# Patient Record
Sex: MALE | Race: NATIVE HAWAIIAN OR OTHER PACIFIC ISLANDER | Employment: PART TIME | URBAN - METROPOLITAN AREA
[De-identification: names, ages, dates, MRNs, and addresses within clinical notes are randomized per-mention and may not be internally consistent; named-entity substitution may affect disease eponyms.]

---

## 2021-03-16 ENCOUNTER — TELEPHONE (OUTPATIENT)
Dept: FAMILY MEDICINE | Facility: CLINIC | Age: 36
End: 2021-03-16

## 2021-03-16 NOTE — TELEPHONE ENCOUNTER
Telephone Intake--TG Adult  Date: 3/16/2021  Client Name:  Trenton  Preferred Name: BRANDEE     MRN: [id number]  : [birth year]       Age: 35  Caller Name: Self        Presenting Problem / Reason for Assessment   (Clinical History &Symptoms):     MTF  Identifies: Trans / Nonbinary  Goal: femininzation for overall health of both body / mind  Decrease dysphoria  Support: Positive  Dresses female openly  Wanting to see a therapist - resources sent      Clarify their goals/expected services from TG medical care--what are they looking for?    Clarify with patient (as necessary) that we are not a primary care clinic and that we do not have a surgeon. We strongly recommend that patients have a primary care doctor for their overall health needs, and we can refer to primary care clinics. We can assist with surgery referrals.  Future      Length of time experiencing symptoms: Within the last year....Finally could identify self      Seen Other Providers for Gender (if so, where):    M.D/other.(hormones) : NO  --If yes, request records from the provider at the 1st session.    Therapist: NO  --if yes, request a referral or summary letter from the therapist at the 1st session..    Psychiatrist: NO  --if yes,, request records from the provider at the 1st session..      Other Medical/Mental Health Diagnoses: Gender Dysphoria        If needed, clarify if patient calling from group home or other supervised living arrangement    Note if patient has no mental health or cognitive diagnosis, but phone behavior suggests impairment      Medications:  N/A      Primary Care Provider:   NO                  --If multiple medical conditions, request recent records from primary doctor at the 1st session..      Referral Source:  Friends / Trans Community      Follow Up:        Please Verify Registration    If patient has [employer] or [employer], send to .     Prep Welcome Packet and have patient come half hour beforehand to fill  out forms in packet (health history form, transgender history, Safety Screening, PHQ9, and LEX-7.

## 2021-04-26 ENCOUNTER — VIRTUAL VISIT (OUTPATIENT)
Dept: FAMILY MEDICINE | Facility: CLINIC | Age: 36
End: 2021-04-26
Payer: COMMERCIAL

## 2021-04-26 DIAGNOSIS — F64.0 GENDER DYSPHORIA IN ADOLESCENT AND ADULT: Primary | ICD-10-CM

## 2021-04-26 PROCEDURE — 99205 OFFICE O/P NEW HI 60 MIN: CPT | Mod: GT | Performed by: FAMILY MEDICINE

## 2021-04-26 NOTE — PROGRESS NOTES
"The patient has been notified of following:       Patient has given verbal consent for Video visit? Yes    Patient would like the video invitation sent by: Send to e-mail at: [employer]    Video Start Time: 1:41 PM         Subjective       E is a 35 year old individual who presents today for the following   health issues:       TRANSGENDER HISTORY AND PSYCHOSOCIAL ASSESSMENT    IDENTIFICATION:  35-year-old non-binary, trans-feminine patient.    CHIEF CONCERN:  Hormone therapy.     Transgender Diagnostic and Assessment    S.O.G.I.  Patient's Preferred First Name:  E  Patient's pronouns:  they/them/theirs       Patient's gender identity:  Transgender Female / Male-to-Female  Patient's sex assigned at birth:  Male    Steps patient has taken to transition, if any:     Presentation aligned with gender identity                   PAST GENDER THERAPY: none  PAST HORMONE USE: none  GENDER RELATED SURGERIES: none    INTRODUCTION AND GOALS    36 year old non binary feminine person presenting for hormone therapy with goal of \"feeling more like myself\", with increased softeness physically and emotionally    GENDER DEVELOPMENT   Dictation on: 05/10/2021 10:22 AM by: AHSAN SCHOFIELD [[id number]]         BODY,  ANATOMIC CONCERNS  During puberty, the patient recalls feeling simply shut down. Would walk slumped over to hide body.  Denies any history of self harm. Currently, would most like to change and develop breasts, hips and minimize Jameson's apple.  Currently, would like to maintain erectile function but also finds it somewhat distressing.          DISCLOSURE and RELATIONSHIP, SOCIAL IMPACTS  They currently live with a roommate, and the landlord's friends live downstairs.  They are out to the roommate regarding gender but not out regarding medical transition. Plans to do so in the next day or so.  Roommate is supportive.  Other household members, the patient is out these friends and landlord who are supportive.  The patient's ex-spouse " is supportive.  They have no children.  He is currently self employed but patient is out to other work colleagues and these are supportive.  The patient's parents are not part of the patient's life.  The patient is out to siblings who are supportive.  The patient does not have a primary care provider.  They are involved in multiple political action groups, dance and  theater, is out to these social networks and members are supportive.            CURRENT SOCIAL, LEGAL OR PHYSICAL TRANSITIONS    Legal Transitions: (Name, Gender Markers, other) none  Social Transitions:   --Present as affirmed gender in private: yes  --Present as affirmed gender with immediate family: yes  --present as affirmed gender work/school: yes  --Use name/pronouns: yes  --Other:    Physical transitions: (hair removal, binder, other) none      CURRENT SOCIAL, ECONOMIC AND HEALTH SYSTEMS SUPPORTS  The patient relies on best friends in [place] for emotional support, friends and sisters for logistical support.  Does not have a primary care provider.  Housing is stable.  Relies on bike and access to another car owned by others for transportation.  Financial situation is stable, has health insurance.            TRANSGENDER SUPPORTS/ TRANSPHOBIA    The patient knows other transpeople directly and friends have knowledge of [facility] resources, although the patient does not have them directly.          PSYCHIATRIC HISTORY  The patient has no history of mental health concerns.  Has alcohol maybe once a month.  Was a regular marijuana user in the past, but currently only once every 2 weeks.  No history of eating disorders or self-harm.  No history of psychiatric hospitalizations or suicide attempts.  PHQ-9 is 1 GD7 is 3.      Mental Status Assessment:  Appearance:  No apparent distress, Casually dressed and Well groomed  Behavior/relationship to examiner/demeanor:  Cooperative, Engaged and Pleasant  Orientation: Oriented to person, place, time and  "situation  Speech Rate:  Normal  Speech Spontaneity:  Normal  Mood:  \"good\"  Affect:  Appropriate/mood-congruent  Thought Process (Associations):  Logical, Linear and Goal directed  Thought Content:  no evidence of suicidal or homicidal ideation  Abnormal Perception:  None  Attention/Concentration:  Normal  Language:  Intact  Insight:  Good  Judgment:  Good    Motor activity/EPS:  Normal  Fund of Knowledge/Intelligence:  Average    A/P  1. Gender dysphoria    Patient counseled on the following issues:    Patient meets the guideline criteria for gender dysphoria as outlined in DSM-5 and WPATH SOC 7, and may benefit from from hormone therapy.       They appear to have strong and evolving understanding of their gender identity, and but are not yet able to communicate that understanding with sufficient clarity to guide hormone therapy.     --Continue to work on further refining goals for hormone therapy and consider working with gender therapist to  assist in gender exploration, hormone goals. Discussed mechanism for approaching this      As per WPATH SOC 7 guidelines, mental health issues are currently reasonably well controlled.        They have engaged in or have a plan to manage appropriate social transitions and any associated disclosures of their gender status, reducing risk of psychosocial harms.     They have identified an emotional and logistical support system to assist them with challenges commonly associated with medical and social transition.        They have knowledge of transgender peer/community resources and have transgender peer support.   --Will have staff send trans resource list.       There are no significant financial, insurance, transportation or housing barriers to safe, effective hormone therapy at this time; except strongly recommend they establish care with a PCP.    Patient to schedule medical evauation     60 minutes spent on the date of the encounter doing chart review, patient visit and " documentation      Manuel Espinal MD, PhD  [facility]      Video-Visit Details    Type of service:  Video Visit    Video End Time (time video stopped): 227    Originating Location (pt. Location): Home    Distant Location (provider location):  [facility]     Mode of Communication:  Video Conference via video platform: Oleg Espinal MD        [facility]  Questions were elicited and answered.

## 2021-05-01 ENCOUNTER — HEALTH MAINTENANCE LETTER (OUTPATIENT)
Age: 36
End: 2021-05-01

## 2021-05-10 PROBLEM — F64.0 GENDER DYSPHORIA IN ADOLESCENT AND ADULT: Status: ACTIVE | Noted: 2021-05-10

## 2021-05-11 ASSESSMENT — ANXIETY QUESTIONNAIRES
3. WORRYING TOO MUCH ABOUT DIFFERENT THINGS: NOT AT ALL
2. NOT BEING ABLE TO STOP OR CONTROL WORRYING: NOT AT ALL
6. BECOMING EASILY ANNOYED OR IRRITABLE: SEVERAL DAYS
5. BEING SO RESTLESS THAT IT IS HARD TO SIT STILL: NOT AT ALL
1. FEELING NERVOUS, ANXIOUS, OR ON EDGE: SEVERAL DAYS
7. FEELING AFRAID AS IF SOMETHING AWFUL MIGHT HAPPEN: NOT AT ALL
GAD7 TOTAL SCORE: 3

## 2021-05-11 ASSESSMENT — PATIENT HEALTH QUESTIONNAIRE - PHQ9
SUM OF ALL RESPONSES TO PHQ QUESTIONS 1-9: 1
5. POOR APPETITE OR OVEREATING: SEVERAL DAYS

## 2021-05-11 NOTE — PROGRESS NOTES
The patient describes as a child feeling more attached to their sisters and mother rather than male family members.  Remembers feeling fascinated looking at pictures of the female reproductive system in father's medical books. From the ages of [age] tended to suppress gender impulses, learned to fit into being male, joined sports teams and belonged to masculine peer groups.  This continued throughout high school.  After high school, followed family's and cultural Confucianism, parental and social messaging regarding being male. Followed male role models.  Recalls thoughts of wishing they could be more feminine. Recalls trying to grow hair out and that this was considered too feminine.   They got  at the age of 34, initially to an sex assigned at birth female partner.  Initially both identified as cisgender, but ultimately both the patient and wife identified as transgender. Approximately 2 years ago, the patient was able to learn about transgender and the gender spectrum on the Internet, social media and popular media. Began looking at trans sites more intentionally about 2 years ago.  Spouse and the patient moved to [place] in 2014.  Both began increasingly exploring their own genders and wondering about the other's gender as well.  Spouse and the patient are now .  Last summer the patient began considering hormone therapy and has been thinking about this during the pandemic, working less often. Has explored Reddit, pictures and time lines on the Internet. Has grown their hair out and is dressing more frequently, initially identifying as queer, then nonbinary, now leaning more feminine.

## 2021-05-12 ASSESSMENT — ANXIETY QUESTIONNAIRES: GAD7 TOTAL SCORE: 3

## 2021-06-10 ENCOUNTER — VIRTUAL VISIT (OUTPATIENT)
Dept: FAMILY MEDICINE | Facility: CLINIC | Age: 36
End: 2021-06-10
Payer: COMMERCIAL

## 2021-06-10 DIAGNOSIS — F64.0 GENDER DYSPHORIA IN ADOLESCENT AND ADULT: Primary | ICD-10-CM

## 2021-06-10 PROCEDURE — 99215 OFFICE O/P EST HI 40 MIN: CPT | Mod: GT | Performed by: FAMILY MEDICINE

## 2021-06-10 RX ORDER — ESTRADIOL 2 MG/1
2 TABLET ORAL DAILY
Qty: 30 TABLET | Refills: 1 | Status: SHIPPED | OUTPATIENT
Start: 2021-06-10 | End: 2021-07-15

## 2021-06-10 NOTE — PROGRESS NOTES
The patient has been notified of following:       Patient has given verbal consent for Video visit? Yes    Patient would like the video invitation sent by: Send to e-mail at: [employer]    Video Start Time: 1:01 PM         Subjective       E is a 36 year old individual who presents today for the following   health issues:  This is a transgender medical evaluation.    IDENTIFICATION:  36-year-old nonbinary feminine patient.    CHIEF CONCERN: Hormone therapy.    HISTORY OF PRESENT ILLNESS:  The patient has a long-standing history of gender dysphoria.  The gender history and psychosocial assessment was performed on 04/26/2021.  Please see this document for this history.  The patient's goals for hormone therapy are to achieve physical characteristics consistent with the affirmed gender and to reduce gender dysphoria.     CURRENT MEDICAL CONDITIONS:  None.    ALLERGIES:  Cephalosporins.    MEDICATIONS:  On no chronic medications.    PAST MEDICAL HISTORY:  Hernia repair in childhood, history of hospitalization for a concussion in college.    FAMILY HISTORY:  Mother with prediabetes.  Father with high blood pressure and thyroid disorder.  Sister with asthma and thyroid disorder.  Maternal grandfather with heart disease, diabetes.  Paternal grandmother with arthritis.  Two paternal aunts with unknown cancers.    SOCIAL HISTORY:  The patient eats a standard diet, including dairy.  Activity is biking and running for transportation.  The patient actually works as a .  No tobacco use.  The patient has history of recreational marijuana use in their mid 20s, then became related to stress and elevated to daily use both with smoking and edibles.  However, since 01/2021, uses marijuana either very rarely if at all.  No use of other substances.     SEXUAL HISTORY:  The patient is not currently sexually active with a partner. Has a total of 4 partners in lifetime.  Has been tested for HIV.  No history of STIs.  Has used  "condoms with past partners.  Is unknown about hep B vaccine status.    REVIEW OF SYSTEMS:  Notable for some mild exercise-induced asthma, which is rare since moving to [place].     PHYSICAL EXAMINATION:  The patient underwent a complete physical exam on 05/28/2021 with primary care provider and this exam was reviewed by me and copy forward below.    Copied forward exam from PCP on 5/28/2021  OBJECTIVE:  Vitals: /81 (BP Location: Right Arm, BP Cuff Size: Regular)  Pulse 83  Ht 5' 7\" (1.702 m)  Wt 133 lb 11.2 oz (60.6 kg)  BMI 20.94 kg/m    General: Healthy-appearing, well-appearing male in no acute distress. Alert, appropriate, non-toxic appearing. Breathing comfortably.   HEENT: Head: Normacephalic. Eyes: PERRLA. EOM intact. Conjunctiva and sclera noninjected and clear bilaterally. Ears: Bilateral TM's and external canals normal, Mouth: Moist, mucous membrane without erythema, exudate, or lesions. Dentition good without caries.   Neck: Supple, without lymphadenopathy, thyromegaly or mass. Trachea midline.   Upper Extremities: FROM with good strength, no lesions or deformities.   CV: RRR without murmurs, rubs or gallops.   Resp: Clear to auscultation without crackles, wheezes or distress.   Abdomen: Soft, non-tender, without hepatosplenomegaly, masses, or hernias.   Lower Extremities: FROM, normal gait without edema, lesions, or deformity. Strength 5/5.   Genitalia: Deferred  Skin: No abnormal lesions or rash  Neuro: CN II-XII, motor & sensory function all intact.   Psychiatric: Alert & oriented with normal affect and insight. Good eye contact. Mood congruent. Patient does not appear depressed or anxious    Lab results from 5/28/2021 reviewed:  Glucose 82  CBC normal  Cholesterol 0 - 199 mg/dL 200High   [facility]   Triglyceride <=149 mg/dL 87  [facility]   HDL Cholesterol >=40 mg/dL 79  [facility]   LDL, Calculated <130 mg/dL 104  [facility]   Non HDL Chol, " Calculated  mg/dL 121  [facility]   Cholesterol/HDL Ratio   2.5  [facility]   Hours Fasting   1       A/P  1. Gender dysphoria  The feminizing effects of hormone therapy were discussed at length, along the variability of outcomes and general timeframe for expected feminizing changes. Permanent vs semi-reversible changes were reviewed.   Patient was counseled regarding the potential risks and side effects of feminizing therapy including:  - reduced fertility, reproductive options, need for ongoing contraception (if indicated),  -changes to sexual function including reduced erections, libido and ejaculation  -potential for weight gain, changes to trigylcerides, and other indirect metabolic effects  -increased risk of venous thromboembolic events, gallstones, liver function tests  --mood changes, long term cardiovascular risks, potential cancer risks including breast cancer    Reviewed effects of hormone therapy on exercise given work as     Medications used in hormone therapy and methods of administration were reviewed.  Questions regarding bioidentical hormones were addressed.    I discussed the possible risk of temporary or irreversible impairment of the fertility with the patient today. She demonstrated a complete understanding of the fertility preservation options and declined fertility preservation.    Questions were elicited and answered, and patient verbally consent to begin hormone therapy.    Start estradiol 2 mg daily  Labs: testosterone, CMP as soon as possible    Follow-up 1-2 months.          Assessment & Plan   Problem List Items Addressed This Visit        Medium    Gender dysphoria in adolescent and adult - Primary    Relevant Medications    estradiol (ESTRACE) 2 MG tablet    Other Relevant Orders    SCANNED MISC. LAB RESULTS             51 minutes spent on the date of the encounter doing chart review, review of outside records, review of test results, interpretation  of tests, patient visit and documentation        Manuel Espinal MD  [facility]  Video-Visit Details    Type of service:  Video Visit    Video End Time (time video stopped): 146    Originating Location (pt. Location): Home    Distant Location (provider location):  [facility]     Mode of Communication:  Video Conference via video platform: Oleg Espinal MD        [facility]  Questions were elicited and answered.

## 2021-06-22 ENCOUNTER — MYC MEDICAL ADVICE (OUTPATIENT)
Dept: FAMILY MEDICINE | Facility: CLINIC | Age: 36
End: 2021-06-22

## 2021-07-15 ENCOUNTER — VIRTUAL VISIT (OUTPATIENT)
Dept: FAMILY MEDICINE | Facility: CLINIC | Age: 36
End: 2021-07-15
Payer: COMMERCIAL

## 2021-07-15 DIAGNOSIS — F64.0 GENDER DYSPHORIA IN ADOLESCENT AND ADULT: ICD-10-CM

## 2021-07-15 PROCEDURE — 99214 OFFICE O/P EST MOD 30 MIN: CPT | Mod: GT | Performed by: FAMILY MEDICINE

## 2021-07-15 RX ORDER — ESTRADIOL 2 MG/1
4 TABLET ORAL DAILY
Qty: 60 TABLET | Refills: 2 | Status: SHIPPED | OUTPATIENT
Start: 2021-07-15 | End: 2021-10-21

## 2021-07-15 RX ORDER — SPIRONOLACTONE 100 MG/1
100 TABLET, FILM COATED ORAL DAILY
Qty: 30 TABLET | Refills: 2 | Status: SHIPPED | OUTPATIENT
Start: 2021-07-15 | End: 2021-10-21

## 2021-07-15 ASSESSMENT — ENCOUNTER SYMPTOMS
UNEXPECTED WEIGHT CHANGE: 0
FEVER: 0
CHILLS: 0

## 2021-07-15 NOTE — PROGRESS NOTES
The patient has been notified of following:       Patient has given verbal consent for Video visit? Yes    Patient would like the video invitation sent by: Other e-mail: [employer]    Video Start Time: 2:00 PM              EMILIA IRVIN is a 36 year old individual that uses pronouns They/Them/Their/Theirs that presents today for follow up of:  feminizing hormone therapy.   Alone or accompanied by: accompanied today by  Gender identity: gender nonbinary  Started Hormone  therapy  6/2021  Continues on Estrace 2* mg daily   Any special concerns today?    No concerns, tolerating well  Did labs at [facility]    On hormones?  YES +++ Shot day of the week? Not applicable-taking pills/patch/gel      Due for labs?  Yes      +++ Refills of meds needed?  Yes  Gender related body changes since last visit:   Skin softer  Mood improved, emotional more open, accessible      Breakthrough bleeding? Does Not Apply    New health concerns since last visit:  ---no    No past surgical history on file.    Patient Active Problem List   Diagnosis     Gender dysphoria in adolescent and adult       Current Outpatient Medications   Medication Sig Dispense Refill     estradiol (ESTRACE) 2 MG tablet Take 1 tablet (2 mg) by mouth daily 30 tablet 1       History   Smoking Status     Not on file   Smokeless Tobacco     Not on file          Allergies   Allergen Reactions     Cefadroxil        There are no preventive care reminders to display for this patient.      Problem, Medication and Allergy Lists were reviewed and are current..         Review of Systems:   Review of Systems   Constitutional: Negative for chills, fever and unexpected weight change.              Labs:   Results from last visit:  No results found for any previous visit.     Patient reading off patient portal  Testosterone: 765 total  Free 12.2  CMP within normal range    EXAM:  Constitutional: healthy, alert and no distress   Psychiatric: mentation appears normal and affect  normal/bright     Assessment and Plan   1. Gender dysphoria  Tolerating estradiol well  Increase to 4 mg estradiol daily  Start spironolactone 100 mg daily, instructed to keep hydrated, go slowly from sit to stand  Labs: BMP, testosterone 1 month   follow-up 3 months           Video-Visit Details    Type of service:  Video Visit    Video End Time (time video stopped): 215    Originating Location (pt. Location): Home    Distant Location (provider location):  [facility]     Mode of Communication:  Video Conference via video platform: hannah Espinal MD        [facility] by [facility]  Questions were elicited and answered.     Manuel Espinal MD

## 2021-07-29 PROBLEM — J45.41 MODERATE PERSISTENT ASTHMA WITH EXACERBATION: Status: ACTIVE | Noted: 2021-07-29

## 2021-09-24 ENCOUNTER — MYC MEDICAL ADVICE (OUTPATIENT)
Dept: FAMILY MEDICINE | Facility: CLINIC | Age: 36
End: 2021-09-24

## 2021-10-04 ENCOUNTER — MYC MEDICAL ADVICE (OUTPATIENT)
Dept: FAMILY MEDICINE | Facility: CLINIC | Age: 36
End: 2021-10-04

## 2021-10-08 LAB
ANION GAP SERPL CALC-SCNC: 8 MMOL/L (ref 7–16)
BUN SERPL-MCNC: 13 MG/DL (ref 7–26)
CALCIUM (EXTERNAL): 9.6 MG/DL (ref 8.4–10.4)
CHLORIDE (EXTERNAL): 105 MMOL/L (ref 98–109)
CO2 (EXTERNAL): 27 MMOL/L (ref 20–29)
CREATININE (EXTERNAL): 0.86 MG/DL (ref 0.73–1.18)
GFR ESTIMATED (EXTERNAL): ABNORMAL ML/MIN/1.73M2
GFR ESTIMATED (IF AFRICAN AMERICAN) (EXTERNAL): ABNORMAL ML/MIN/1.73M2
GLUCOSE (EXTERNAL): 90 MG/DL (ref 70–180)
POTASSIUM (EXTERNAL): 4.4 MMOL/L (ref 3.5–5.1)
SODIUM (EXTERNAL): 140 MMOL/L (ref 136–145)

## 2021-10-11 ENCOUNTER — HEALTH MAINTENANCE LETTER (OUTPATIENT)
Age: 36
End: 2021-10-11

## 2021-10-11 LAB
TESTOST SERPL-MCNC: 192 NG/DL
TESTOSTERONE FREE: 1.2 NG/DL

## 2021-10-21 ENCOUNTER — VIRTUAL VISIT (OUTPATIENT)
Dept: FAMILY MEDICINE | Facility: CLINIC | Age: 36
End: 2021-10-21
Payer: COMMERCIAL

## 2021-10-21 DIAGNOSIS — Z31.89 ENCOUNTER FOR FERTILITY PLANNING: Primary | ICD-10-CM

## 2021-10-21 DIAGNOSIS — F64.0 GENDER DYSPHORIA IN ADOLESCENT AND ADULT: ICD-10-CM

## 2021-10-21 PROCEDURE — 99215 OFFICE O/P EST HI 40 MIN: CPT | Mod: GT | Performed by: FAMILY MEDICINE

## 2021-10-21 RX ORDER — SPIRONOLACTONE 100 MG/1
100 TABLET, FILM COATED ORAL DAILY
Qty: 90 TABLET | Refills: 0 | Status: SHIPPED | OUTPATIENT
Start: 2021-10-21

## 2021-10-21 RX ORDER — ESTRADIOL 2 MG/1
4 TABLET ORAL DAILY
Qty: 180 TABLET | Refills: 0 | Status: SHIPPED | OUTPATIENT
Start: 2021-10-21

## 2021-10-21 ASSESSMENT — ENCOUNTER SYMPTOMS
CHILLS: 0
SHORTNESS OF BREATH: 0
UNEXPECTED WEIGHT CHANGE: 0
FEVER: 0
LIGHT-HEADEDNESS: 0

## 2021-10-21 NOTE — PROGRESS NOTES
The patient has been notified of following:       Patient has given verbal consent for Video visit? Yes    Patient would like the video invitation sent by: Other e-mail: [employer]    Video Start Time: 1:17 PM              EMILIA     E is a 36 year old individual that uses pronouns They/Them/Their/Theirs that presents today for follow up of:  feminizing hormone therapy.   Alone or accompanied by: accompanied today by  Gender identity: gender nonbinary  Started Hormone  therapy  6/2021  Continues on Estrace 4* mg daily  and Spironlactone 100* mg daily   Any special concerns today?    Goals for hormone therapy have changed  Patient has partner now and this has trigger changes (female partner), want to preserve option for genetic children in future; want sexual/erectile function  No current problems with getting or keeping erections  Happy with current changes, softer skin, breast changes  Off spironolactone x 3 weeks due to lack of refills  Moving to [place]  November        On hormones?  YES +++ Shot day of the week? Not applicable-taking pills/patch/gel      Due for labs?  No      +++ Refills of meds needed?  Yes  Gender related body changes since last visit:   Softer skin  Nipple sensitivity  Less facial hair, body hair  Body shape changes  Emotional changes, orgasm feel different--all welcome    Breakthrough bleeding? Does Not Apply    New health concerns since last visit:  ---none    No past surgical history on file.    Patient Active Problem List   Diagnosis     Gender dysphoria in adolescent and adult     Moderate persistent asthma with exacerbation       Current Outpatient Medications   Medication Sig Dispense Refill     estradiol (ESTRACE) 2 MG tablet Take 2 tablets (4 mg) by mouth daily 60 tablet 2     spironolactone (ALDACTONE) 100 MG tablet Take 1 tablet (100 mg) by mouth daily 30 tablet 2       History   Smoking Status     Not on file   Smokeless Tobacco     Not on file          Allergies   Allergen  Reactions     Cefadroxil        There are no preventive care reminders to display for this patient.      Problem, Medication and Allergy Lists were reviewed and are current..         Review of Systems:   Review of Systems   Constitutional: Negative for chills, fever and unexpected weight change.   Respiratory: Negative for shortness of breath.    Cardiovascular: Negative for chest pain and leg swelling.   Neurological: Negative for light-headedness.              Labs:   Results from last visit:  Virtual Visit on 07/15/2021   Component Date Value Ref Range Status     Sodium (External) 10/08/2021 140  136 - 145 mmol/L Final     Potassium (External) 10/08/2021 4.4* 3.5 - 5.1 mmol/L Final     Chloride (External) 10/08/2021 105  98 - 109 mmol/L Final     CO2 (External) 10/08/2021 27  20 - 29 mmol/L Final     Anion Gap (External) 10/08/2021 8  7 - 16 mmol/L Final     Glucose (External) 10/08/2021 90  70 - 180 mg/dL Final     Urea Nitrogen (External) 10/08/2021 13  7 - 26 mg/dL Final     Creatinine (External) 10/08/2021 0.86  0.73 - 1.18 mg/dL Final     Calcium (External) 10/08/2021 9.6  8.4 - 10.4 mg/dL Final     GFR Estimated (External) 10/08/2021 -  mL/min/1.73m2 Final     GFR Estimated (if * 10/08/2021 -  mL/min/1.73m2 Final     Testosterone Total 10/08/2021 192  ng/dL Final     Testosterone Free 10/08/2021 1.2  ng/dL Final         EXAM:  Constitutional: healthy, alert and no distress   Psychiatric: mentation appears normal and affect normal/bright     Assessment and Plan   1. Gender dysphoria  2. Fertility planning  Counseled regarding sperm banking, plan to refill current meds and do this in [place]  Would need to be off hormones 3 months approximately to have fertility optimal  Counseled regarding options for sexual function including slow up on hormones until reaches difficulty, vs. PDE-5 inhibitors or nondrug management    Reviewed labs with patient    3 months given refills    Follow-up prn      40  minutes spent on the date of the encounter doing chart review, review of test results, interpretation of tests and patient visit            Video-Visit Details    Type of service:  Video Visit    Video End Time (time video stopped): 136    Originating Location (pt. Location): Home    Distant Location (provider location):  [facility]     Mode of Communication:  Video Conference via video platform: Oleg Espinal MD        [facility]  Questions were elicited and answered.     Manuel Espinal MD

## 2022-05-22 ENCOUNTER — HEALTH MAINTENANCE LETTER (OUTPATIENT)
Age: 37
End: 2022-05-22

## 2022-07-17 ENCOUNTER — HEALTH MAINTENANCE LETTER (OUTPATIENT)
Age: 37
End: 2022-07-17

## 2022-09-25 ENCOUNTER — HEALTH MAINTENANCE LETTER (OUTPATIENT)
Age: 37
End: 2022-09-25

## 2023-08-05 ENCOUNTER — HEALTH MAINTENANCE LETTER (OUTPATIENT)
Age: 38
End: 2023-08-05

## 2024-09-28 ENCOUNTER — HEALTH MAINTENANCE LETTER (OUTPATIENT)
Age: 39
End: 2024-09-28